# Patient Record
Sex: MALE | Race: WHITE | Employment: OTHER | URBAN - METROPOLITAN AREA
[De-identification: names, ages, dates, MRNs, and addresses within clinical notes are randomized per-mention and may not be internally consistent; named-entity substitution may affect disease eponyms.]

---

## 2017-04-25 ENCOUNTER — OFFICE VISIT (OUTPATIENT)
Dept: INTERNAL MEDICINE CLINIC | Age: 37
End: 2017-04-25

## 2017-04-25 VITALS
WEIGHT: 187.2 LBS | HEIGHT: 67 IN | RESPIRATION RATE: 16 BRPM | SYSTOLIC BLOOD PRESSURE: 120 MMHG | BODY MASS INDEX: 29.38 KG/M2 | DIASTOLIC BLOOD PRESSURE: 70 MMHG | TEMPERATURE: 98.5 F | HEART RATE: 84 BPM | OXYGEN SATURATION: 98 %

## 2017-04-25 DIAGNOSIS — R35.0 URINE FREQUENCY: ICD-10-CM

## 2017-04-25 DIAGNOSIS — Z12.5 PROSTATE CANCER SCREENING: ICD-10-CM

## 2017-04-25 DIAGNOSIS — Z00.00 ROUTINE GENERAL MEDICAL EXAMINATION AT A HEALTH CARE FACILITY: Primary | ICD-10-CM

## 2017-04-25 DIAGNOSIS — N40.0 ENLARGED PROSTATE: ICD-10-CM

## 2017-04-25 DIAGNOSIS — E03.9 HYPOTHYROIDISM, UNSPECIFIED TYPE: ICD-10-CM

## 2017-04-25 DIAGNOSIS — F33.1 MODERATE EPISODE OF RECURRENT MAJOR DEPRESSIVE DISORDER (HCC): ICD-10-CM

## 2017-04-25 DIAGNOSIS — E55.9 VITAMIN D DEFICIENCY: ICD-10-CM

## 2017-04-25 DIAGNOSIS — F41.9 ANXIETY: ICD-10-CM

## 2017-04-25 DIAGNOSIS — I10 ESSENTIAL HYPERTENSION: ICD-10-CM

## 2017-04-25 RX ORDER — LEVOTHYROXINE SODIUM 100 UG/1
TABLET ORAL
COMMUNITY
End: 2017-05-08 | Stop reason: SDUPTHER

## 2017-04-25 RX ORDER — ESCITALOPRAM OXALATE 20 MG/1
20 TABLET ORAL DAILY
COMMUNITY

## 2017-04-25 RX ORDER — CLONAZEPAM 0.5 MG/1
TABLET ORAL
COMMUNITY

## 2017-04-25 RX ORDER — BUPROPION HYDROCHLORIDE 100 MG/1
TABLET, EXTENDED RELEASE ORAL
COMMUNITY

## 2017-04-25 RX ORDER — LISINOPRIL 20 MG/1
TABLET ORAL DAILY
COMMUNITY
End: 2017-05-08 | Stop reason: SDUPTHER

## 2017-04-25 NOTE — PROGRESS NOTES
New Patient       HPI:  Verito Hutchison is a 40y.o. year old male who is here to establish care. He  had his medical care: Had PCP a long time ago Dr. Katian Chandler in [place]. Friend of a girlfriend referred me. He reports the following history and medical concerns:      HTN- lisinopril. [facility]- 2007 diagnosis. No cough with medication. Anxiety and Depression- [address]- and then went to [facility] MD (psychiatry in [place]). Increased lexapro 20 mg to 30 mg and restarted wellbutrin. Takes clonazepam once a day for panic attacks. Low thyroid- takes 100 mcg once a day. Last checked a year ago. Urinating a lot- had problem for a long time. Weak urine stream.  Random. Drinks a lot of coffee. Denies hesitancy. Flood z[place] are not open. Push harder sometimes but not all of the time. When he goes again, it is not as much (which means he is emptying out)  Going on for at least 10 years. Has apneic episodes and daytime fatigue. History of low Vit D        Assessment and Plan        1. Routine general medical examination at a health care facility  Needs to increase exercise regularly to 150 minutes a week. - CBC WITH AUTOMATED DIFF  - TSH REFLEX TO T4  - METABOLIC PANEL, COMPREHENSIVE  - UA/M W/RFLX CULTURE, ROUTINE  - MICROALBUMIN, UR, RAND W/ MICROALBUMIN/CREA RATIO    2. Urine frequency  Check urine for sugar. Take longer time to empty. Reduce caffeine intake. 3. Moderate episode of recurrent major depressive disorder ([address]  As per psychiatry. On lexapro and wellbutrin SR    4. Anxiety  Uses clonazepam each night. Does not mix with alcohol. 5. Essential hypertension  Tolerating medication. Discussion on its effectiveness and queried about side effects. Patient agrees to stay on medication and demonstrate compliance. No adjustments made on  Lisinopril. - MICROALBUMIN, UR, RAND W/ MICROALBUMIN/CREA RATIO    6.  Hypothyroidism, unspecified type  Tolerating medication. Discussion on its effectiveness and queried about side effects. Patient agrees to stay on medication and demonstrate compliance. No adjustments made on  Synthroid. No symptoms of low or high thyroid. 7. Vitamin D deficiency  Recheck vit . D  - VITAMIN D, 25 HYDROXY    8. Prostate cancer screening  Appears to have incomplete emptying. Possible enlarged prostate. Too early to screen for cancer. 9. Enlarged prostate  Check PSA   - PROSTATE SPECIFIC AG        Visit Vitals    /70 (BP 1 Location: Left arm, BP Patient Position: Sitting)    Pulse 84    Temp 98.5 °F (36.9 °C) (Oral)    Resp 16    Ht 5' 6.53\" (1.69 m)    Wt 187 lb 3.2 oz (84.9 kg)    SpO2 98%    BMI 29.73 kg/m2       Historical Data    Past Medical History:   Diagnosis Date    Depression     GERD (gastroesophageal reflux disease)     Hypertension     Thyroid disease        Past Surgical History:   Procedure Laterality Date    HX APPENDECTOMY      2016       Outpatient Encounter Prescriptions as of 4/25/2017   Medication Sig Dispense Refill    escitalopram oxalate (LEXAPRO) 20 mg tablet Take 20 mg by mouth daily. Indications: patient take 1 & 1/2 pill per day      buPROPion SR (WELLBUTRIN SR) 100 mg SR tablet Take  by mouth.  clonazePAM (KLONOPIN) 0.5 mg tablet Take  by mouth nightly as needed.  levothyroxine (SYNTHROID) 100 mcg tablet Take  by mouth Daily (before breakfast).  lisinopril (PRINIVIL, ZESTRIL) 20 mg tablet Take  by mouth daily.  CHOLECALCIFEROL, VITD3,/VIT K2 (VITAMIN D3-VITAMIN K2 PO) Take  by mouth. Indications: vitamin d3 plus vitamin K2 5000 IU per day      PYRIDOXINE HCL, VITAMIN B6, (VITAMIN B-6 PO) Take  by mouth. No facility-administered encounter medications on file as of 4/25/2017.          No Known Allergies     Social History     Social History    Marital status:      Spouse name: N/A    Number of children: N/A    Years of education: N/A     Occupational History    Disability for Anxiety/Panic attack      Social History Main Topics    Smoking status: Former Smoker     Types: Cigarettes     Quit date: 1/1/2011    Smokeless tobacco: Current User    Alcohol use No    Drug use: No    Sexual activity: Yes     Partners: Female     Other Topics Concern    Not on file     Social History Narrative    No narrative on file        Review of Systems   Constitutional: Negative for chills, diaphoresis, fever, malaise/fatigue and weight loss. HENT: Negative for hearing loss. Respiratory: Negative for cough. Cardiovascular: Negative for chest pain. Gastrointestinal: Negative for blood in stool and constipation. Genitourinary: Positive for frequency. Negative for dysuria, flank pain and urgency. Musculoskeletal: Negative for myalgias. Skin: Negative for rash. Neurological: Negative for dizziness, weakness and headaches. Endo/Heme/Allergies: Does not bruise/bleed easily. Physical Exam   Constitutional: He is oriented to person, place, and time. He appears well-nourished. No distress. Neck: No thyromegaly present. Cardiovascular: Normal rate, regular rhythm and normal heart sounds. Pulmonary/Chest: Effort normal and breath sounds normal. No respiratory distress. He has no wheezes. Abdominal: Soft. Bowel sounds are normal. He exhibits no mass. There is no tenderness. Hernia confirmed negative in the right inguinal area and confirmed negative in the left inguinal area. Genitourinary: Penis normal. Right testis shows no mass, no swelling and no tenderness. Left testis shows no mass, no swelling and no tenderness. Musculoskeletal: He exhibits no edema or tenderness. Lymphadenopathy:     He has no cervical adenopathy. No inguinal adenopathy noted on the right or left side. Right: No inguinal adenopathy present. Left: No inguinal adenopathy present.    Neurological: He is alert and oriented to person, place, and time. Skin: Skin is warm and dry. No rash noted. No erythema. Psychiatric: He has a normal mood and affect. Thought content normal.   Nursing note and vitals reviewed. Ortho Exam       Orders Placed This Encounter    CBC WITH AUTOMATED DIFF    TSH REFLEX TO T4    METABOLIC PANEL, COMPREHENSIVE    VITAMIN D, 25 HYDROXY    UA/M W/RFLX CULTURE, ROUTINE    MICROALBUMIN, UR, RAND W/ MICROALBUMIN/CREA RATIO    PROSTATE SPECIFIC AG    MICROSCOPIC EXAMINATION    escitalopram oxalate (LEXAPRO) 20 mg tablet     Sig: Take 20 mg by mouth daily. Indications: patient take 1 & 1/2 pill per day    buPROPion SR (WELLBUTRIN SR) 100 mg SR tablet     Sig: Take  by mouth.  clonazePAM (KLONOPIN) 0.5 mg tablet     Sig: Take  by mouth nightly as needed.  levothyroxine (SYNTHROID) 100 mcg tablet     Sig: Take  by mouth Daily (before breakfast).  lisinopril (PRINIVIL, ZESTRIL) 20 mg tablet     Sig: Take  by mouth daily.  CHOLECALCIFEROL, VITD3,/VIT K2 (VITAMIN D3-VITAMIN K2 PO)     Sig: Take  by mouth. Indications: vitamin d3 plus vitamin K2 5000 IU per day    PYRIDOXINE HCL, VITAMIN B6, (VITAMIN B-6 PO)     Sig: Take  by mouth. I have reviewed the patient's medical history in detail and updated the computerized patient record. We had a prolonged discussion about these complex clinical issues and went over the various important aspects to consider. All questions were answered. Advised him to call back or return to office if symptoms do not improve, change in nature, or persist.    He was given an after visit summary or informed of [email or web address] Access which includes patient instructions, diagnoses, current medications, & vitals. He expressed understanding with the diagnosis and plan.

## 2017-04-25 NOTE — PROGRESS NOTES
Chief Complaint   Patient presents with    New Patient     Reviewed record in preparation for visit and have obtained necessary documentation. Identified pt with two pt identifiers(name and ). Health Maintenance Due   Topic    DTaP/Tdap/Td series (1 - Tdap)    INFLUENZA AGE 9 TO ADULT          Chief Complaint   Patient presents with    New Patient        Wt Readings from Last 3 Encounters:   17 187 lb 3.2 oz (84.9 kg)     Temp Readings from Last 3 Encounters:   17 98.5 °F (36.9 °C) (Oral)     BP Readings from Last 3 Encounters:   17 120/70     Pulse Readings from Last 3 Encounters:   17 84           Learning Assessment:  :     Learning Assessment 2017   PRIMARY LEARNER Patient   HIGHEST LEVEL OF EDUCATION - PRIMARY LEARNER  SOME COLLEGE   BARRIERS PRIMARY LEARNER NONE   CO-LEARNER CAREGIVER No   PRIMARY LANGUAGE ENGLISH   LEARNER PREFERENCE PRIMARY VIDEOS   ANSWERED BY patient   RELATIONSHIP SELF       Depression Screening:  :     No flowsheet data found. Fall Risk Assessment:  :     No flowsheet data found. Abuse Screening:  :     Abuse Screening Questionnaire 2017   Do you ever feel afraid of your partner? N   Are you in a relationship with someone who physically or mentally threatens you? N   Is it safe for you to go home? Y       Coordination of Care Questionnaire:  :     1) Have you been to an emergency room, urgent care clinic since your last visit? no   Hospitalized since your last visit? no             2) Have you seen or consulted any other health care providers outside of [address] [place] [address] since your last visit? no  (Include any pap smears or colon screenings in this section.)    3) Do you have an Advance Directive on file? no    4) Are you interested in receiving information on Advance Directives?  NO      Patient is accompanied by self I have received verbal consent from Karma Bell to discuss any/all medical information while they are present in the room. Reviewed record  In preparation for visit and have obtained necessary documentation.

## 2017-04-25 NOTE — MR AVS SNAPSHOT
Visit Information Date & Time Provider Department Dept. Phone Encounter #  
 4/25/2017  1:15 PM [place] [id number] [phone] Follow-up Instructions Return in about 6 months (around 10/25/2017) for Follow up. Upcoming Health Maintenance Date Due DTaP/Tdap/Td series (1 - Tdap) 4/13/2001 INFLUENZA AGE 9 TO ADULT 8/1/2016 Allergies as of 4/25/2017  Review Complete On: 4/25/2017 By: Karine Pickens LPN No Known Allergies Current Immunizations  Never Reviewed No immunizations on file. Not reviewed this visit You Were Diagnosed With   
  
 Codes Comments Routine general medical examination at a health care facility    -  Primary ICD-10-CM: Z00.00 ICD-9-CM: V70.0 Urine frequency     ICD-10-CM: R35.0 ICD-9-CM: 788.41 Moderate episode of recurrent major depressive disorder (HCC)     ICD-10-CM: F33.1 ICD-9-CM: 296.32 Anxiety     ICD-10-CM: F41.9 ICD-9-CM: 300.00 Essential hypertension     ICD-10-CM: I10 
ICD-9-CM: 401.9 Hypothyroidism, unspecified type     ICD-10-CM: E03.9 ICD-9-CM: 881. 9 Vitamin D deficiency     ICD-10-CM: E55.9 ICD-9-CM: 268.9 Prostate cancer screening     ICD-10-CM: Z12.5 ICD-9-CM: V76.44 Enlarged prostate     ICD-10-CM: N40.0 ICD-9-CM: 600.00 Vitals BP Pulse Temp Resp Height(growth percentile) Weight(growth percentile) 120/70 (BP 1 Location: Left arm, BP Patient Position: Sitting) 84 98.5 °F (36.9 °C) (Oral) 16 5' 6.53\" (1.69 m) 187 lb 3.2 oz (84.9 kg) SpO2 BMI Smoking Status 98% 29.73 kg/m2 Former Smoker Vitals History BMI and BSA Data Body Mass Index Body Surface Area  
 29.73 kg/m 2 2 m 2 Preferred Pharmacy [facility] [address] [address] Your Updated Medication List  
  
   
 This list is accurate as of: 4/25/17  2:27 PM.  Always use your most recent med list.  
  
  
  
  
 buPROPion  mg SR tablet Commonly known as:  Juan David Nunez Take  by mouth.  
  
 clonazePAM 0.5 mg tablet Commonly known as:  Chiquis Saucedo Take  by mouth nightly as needed. levothyroxine 100 mcg tablet Commonly known as:  SYNTHROID Take  by mouth Daily (before breakfast). LEXAPRO 20 mg tablet Generic drug:  escitalopram oxalate Take 20 mg by mouth daily. Indications: patient take 1 & 1/2 pill per day  
  
 lisinopril 20 mg tablet Commonly known as:  Oneida Charles Take  by mouth daily. VITAMIN B-6 PO Take  by mouth. VITAMIN D3-VITAMIN K2 PO Take  by mouth. Indications: vitamin d3 plus vitamin K2 5000 IU per day We Performed the Following CBC WITH AUTOMATED DIFF [97824 CPT(R)] METABOLIC PANEL, COMPREHENSIVE [22941 CPT(R)] MICROALBUMIN, UR, RAND W/ MICROALBUMIN/CREA RATIO C871345 CPT(R)] PROSTATE SPECIFIC AG (PSA) D369708 CPT(R)] TSH REFLEX TO T4 [[id number] Custom] UA/M W/RFLX CULTURE, ROUTINE [[id number] Custom] VITAMIN D, 25 HYDROXY [id number] CPT(R)] Follow-up Instructions Return in about 6 months (around 10/25/2017) for Follow up. Patient Instructions Fexofenadine 180 mg once a day And  
nasacort AQ one spray twice a day. Introducing [facility]! Lola Richardson introduces [employer] patient portal. Now you can access parts of your medical record, email your doctor's office, and request medication refills online. 1. In your internet browser, go to [email or web address] 2. Click on the First Time User? Click Here link in the Sign In box. You will see the New Member Sign Up page. 3. Enter your [employer] Access Code exactly as it appears below. You will not need to use this code after youve completed the sign-up process.  If you do not sign up before the expiration date, you must request a new code. · "[employer] Access Code: [email or web address] Expires: 7/24/2017  2:09 PM 
 
4. Enter the last four digits of your Social Security Number (xxxx) and Date of Birth (mm/dd/yyyy) as indicated and click Submit. You will be taken to the next sign-up page. 5. Create a "[employer] ID. This will be your "[employer] login ID and cannot be changed, so think of one that is secure and easy to remember. 6. Create a "[employer] password. You can change your password at any time. 7. Enter your Password Reset Question and Answer. This can be used at a later time if you forget your password. 8. Enter your e-mail address. You will receive e-mail notification when new information is available in [address] 9. Click Sign Up. You can now view and download portions of your medical record. 10. Click the Download Summary menu link to download a portable copy of your medical information. If you have questions, please visit the Frequently Asked Questions section of the "[employer] website. Remember, "[employer] is NOT to be used for urgent needs. For medical emergencies, dial 911. Now available from your iPhone and Android! Please provide this summary of care documentation to your next provider. If you have any questions after today's visit, please call [phone].

## 2017-04-26 LAB
25(OH)D3+25(OH)D2 SERPL-MCNC: 50.6 NG/ML (ref 30–100)
ALBUMIN SERPL-MCNC: 4.8 G/DL (ref 3.5–5.5)
ALBUMIN/CREAT UR: <3.4 MG/G CREAT (ref 0–30)
ALBUMIN/GLOB SERPL: 2 {RATIO} (ref 1.2–2.2)
ALP SERPL-CCNC: 101 IU/L (ref 39–117)
ALT SERPL-CCNC: 18 IU/L (ref 0–44)
APPEARANCE UR: CLEAR
AST SERPL-CCNC: 20 IU/L (ref 0–40)
BACTERIA #/AREA URNS HPF: NORMAL /[HPF]
BASOPHILS # BLD AUTO: 0 X10E3/UL (ref 0–0.2)
BASOPHILS NFR BLD AUTO: 1 %
BILIRUB SERPL-MCNC: 0.5 MG/DL (ref 0–1.2)
BILIRUB UR QL STRIP: NEGATIVE
BUN SERPL-MCNC: 10 MG/DL (ref 6–20)
BUN/CREAT SERPL: 13 (ref 9–20)
CALCIUM SERPL-MCNC: 9.1 MG/DL (ref 8.7–10.2)
CASTS URNS QL MICRO: NORMAL /LPF
CHLORIDE SERPL-SCNC: 101 MMOL/L (ref 96–106)
CO2 SERPL-SCNC: 24 MMOL/L (ref 18–29)
COLOR UR: YELLOW
CREAT SERPL-MCNC: 0.8 MG/DL (ref 0.76–1.27)
CREAT UR-MCNC: 88 MG/DL
EOSINOPHIL # BLD AUTO: 0.3 X10E3/UL (ref 0–0.4)
EOSINOPHIL NFR BLD AUTO: 3 %
EPI CELLS #/AREA URNS HPF: NORMAL /HPF
ERYTHROCYTE [DISTWIDTH] IN BLOOD BY AUTOMATED COUNT: 13.3 % (ref 12.3–15.4)
GLOBULIN SER CALC-MCNC: 2.4 G/DL (ref 1.5–4.5)
GLUCOSE SERPL-MCNC: 95 MG/DL (ref 65–99)
GLUCOSE UR QL: NEGATIVE
HCT VFR BLD AUTO: 43.6 % (ref 37.5–51)
HGB BLD-MCNC: 14.5 G/DL (ref 12.6–17.7)
HGB UR QL STRIP: NEGATIVE
IMM GRANULOCYTES # BLD: 0 X10E3/UL (ref 0–0.1)
IMM GRANULOCYTES NFR BLD: 0 %
KETONES UR QL STRIP: NEGATIVE
LEUKOCYTE ESTERASE UR QL STRIP: NEGATIVE
LYMPHOCYTES # BLD AUTO: 1.6 X10E3/UL (ref 0.7–3.1)
LYMPHOCYTES NFR BLD AUTO: 21 %
MCH RBC QN AUTO: 28.9 PG (ref 26.6–33)
MCHC RBC AUTO-ENTMCNC: 33.3 G/DL (ref 31.5–35.7)
MCV RBC AUTO: 87 FL (ref 79–97)
MICRO URNS: NORMAL
MICRO URNS: NORMAL
MICROALBUMIN UR-MCNC: <3 UG/ML
MONOCYTES # BLD AUTO: 0.5 X10E3/UL (ref 0.1–0.9)
MONOCYTES NFR BLD AUTO: 6 %
MUCOUS THREADS URNS QL MICRO: PRESENT
NEUTROPHILS # BLD AUTO: 5.3 X10E3/UL (ref 1.4–7)
NEUTROPHILS NFR BLD AUTO: 69 %
NITRITE UR QL STRIP: NEGATIVE
PH UR STRIP: 6 [PH] (ref 5–7.5)
PLATELET # BLD AUTO: 234 X10E3/UL (ref 150–379)
POTASSIUM SERPL-SCNC: 4.2 MMOL/L (ref 3.5–5.2)
PROT SERPL-MCNC: 7.2 G/DL (ref 6–8.5)
PROT UR QL STRIP: NEGATIVE
PSA SERPL-MCNC: 1.4 NG/ML (ref 0–4)
RBC # BLD AUTO: 5.01 X10E6/UL (ref 4.14–5.8)
RBC #/AREA URNS HPF: NORMAL /HPF
SODIUM SERPL-SCNC: 140 MMOL/L (ref 134–144)
SP GR UR: 1.02 (ref 1–1.03)
TSH SERPL DL<=0.005 MIU/L-ACNC: 2.42 UIU/ML (ref 0.45–4.5)
URINALYSIS REFLEX, 377202: NORMAL
UROBILINOGEN UR STRIP-MCNC: 0.2 MG/DL (ref 0.2–1)
WBC # BLD AUTO: 7.7 X10E3/UL (ref 3.4–10.8)
WBC #/AREA URNS HPF: NORMAL /HPF

## 2017-04-27 NOTE — PROGRESS NOTES
All of your blood tests came back normal including your prostate.   Message sent to patient via [employer]:  April 27, 2017

## 2017-05-08 RX ORDER — LISINOPRIL 20 MG/1
20 TABLET ORAL DAILY
Qty: 90 TAB | Refills: 3 | Status: SHIPPED | OUTPATIENT
Start: 2017-05-08 | End: 2017-10-24 | Stop reason: SDUPTHER

## 2017-05-08 RX ORDER — LEVOTHYROXINE SODIUM 100 UG/1
100 TABLET ORAL
Qty: 90 TAB | Refills: 3 | Status: SHIPPED | OUTPATIENT
Start: 2017-05-08 | End: 2017-10-24 | Stop reason: SDUPTHER

## 2017-10-24 ENCOUNTER — OFFICE VISIT (OUTPATIENT)
Dept: INTERNAL MEDICINE CLINIC | Age: 37
End: 2017-10-24

## 2017-10-24 VITALS
DIASTOLIC BLOOD PRESSURE: 64 MMHG | RESPIRATION RATE: 18 BRPM | WEIGHT: 187 LBS | SYSTOLIC BLOOD PRESSURE: 116 MMHG | BODY MASS INDEX: 29.35 KG/M2 | OXYGEN SATURATION: 98 % | TEMPERATURE: 98.7 F | HEART RATE: 70 BPM | HEIGHT: 67 IN

## 2017-10-24 DIAGNOSIS — F41.9 ANXIETY AND DEPRESSION: ICD-10-CM

## 2017-10-24 DIAGNOSIS — E03.9 HYPOTHYROIDISM, UNSPECIFIED TYPE: ICD-10-CM

## 2017-10-24 DIAGNOSIS — F32.A ANXIETY AND DEPRESSION: ICD-10-CM

## 2017-10-24 DIAGNOSIS — I10 ESSENTIAL HYPERTENSION: Primary | ICD-10-CM

## 2017-10-24 DIAGNOSIS — E55.9 VITAMIN D DEFICIENCY: ICD-10-CM

## 2017-10-24 DIAGNOSIS — Z00.00 ROUTINE GENERAL MEDICAL EXAMINATION AT A HEALTH CARE FACILITY: ICD-10-CM

## 2017-10-24 RX ORDER — LEVOTHYROXINE SODIUM 100 UG/1
100 TABLET ORAL
Qty: 90 TAB | Refills: 3 | Status: SHIPPED | OUTPATIENT
Start: 2017-10-24 | End: 2018-05-31 | Stop reason: SDUPTHER

## 2017-10-24 RX ORDER — LISINOPRIL 20 MG/1
20 TABLET ORAL DAILY
Qty: 90 TAB | Refills: 3 | Status: SHIPPED | OUTPATIENT
Start: 2017-10-24 | End: 2018-09-28 | Stop reason: SDUPTHER

## 2017-10-24 NOTE — PROGRESS NOTES
Hypertension (6 months follow ); Anxiety; and Vitamin D Deficiency       HPI:  Laura Clinton is a 40y.o. year old male who is here for a follow up visit. He was last seen by me 6 months ago. He reports the following:    Hypertension    Patient has a history of HTN. The patient is taking hypertensive medications compliantly without side effects. Denies chest pain, dyspnea, edema, or symptoms of TIA's. BP Readings from Last 3 Encounters:   10/24/17 116/64   04/25/17 120/70          Hypothyroidism    Patient is being follow for underactive thyroid and takes medication regularly. He is aware to take the medication on an empty stomach first thing in the morning and not to eat 30 min to 1 hr after taking their pill. No symptoms of hypo or hyperthyroidism: no decreased or increased weight, no feeling cold/chilly or excessively warm, no diarrhea or constipation, no undue sweatiness, anxiety or palpitations. Lab Results   Component Value Date/Time    TSH 2.420 04/25/2017 02:30 PM         Low vit d. Takes 5000 d3 once a day. Pt reports Tdap a couple of years ago. Depression/Anxiety    Compliant with medications. No impulsive thoughts and/or side effects with medications    Sleep has been better        Assessment and Plan        1. Essential hypertension  Tolerating medication. Denies dizziness that is positional, SOB, or chest pain. Understands the importance of compliance to reduce risk of future heart failure. Agreed to call if any of above symptoms develop and  stay on current regimen of  Lisinopril.   - CBC WITH AUTOMATED DIFF; Future  - METABOLIC PANEL, COMPREHENSIVE; Future  - LIPID PANEL; Future  - TSH 3RD GENERATION; Future  - URINALYSIS W/ RFLX MICROSCOPIC; Future  - MICROALBUMIN, UR, RAND W/ MICROALBUMIN/CREA RATIO; Future  - VITAMIN D, 25 HYDROXY; Future    2. Hypothyroidism, unspecified type  Tolerating thyroid medication and takes on empty stomach.   Aware to watch for symptoms of overactive thyroid like heat intolerance, loose stools, increase appetite, and weight loss along with palpitations. Pt informed if low thyroid symptoms, like cold intolerance, weight gain, hair loss, edema, and mental slowness, please call to get TSH rechecked. Noncompliance of medications can lead to coma and severe mental status changes. Stay on current regimen of  Synthroid 100   - CBC WITH AUTOMATED DIFF; Future  - METABOLIC PANEL, COMPREHENSIVE; Future  - LIPID PANEL; Future  - TSH 3RD GENERATION; Future  - URINALYSIS W/ RFLX MICROSCOPIC; Future  - MICROALBUMIN, UR, RAND W/ MICROALBUMIN/CREA RATIO; Future  - VITAMIN D, 25 HYDROXY; Future    3. Anxiety and depression  As per psychiatry. Pt appears to be doing well. - CBC WITH AUTOMATED DIFF; Future  - METABOLIC PANEL, COMPREHENSIVE; Future  - LIPID PANEL; Future  - TSH 3RD GENERATION; Future  - URINALYSIS W/ RFLX MICROSCOPIC; Future  - MICROALBUMIN, UR, RAND W/ MICROALBUMIN/CREA RATIO; Future  - VITAMIN D, 25 HYDROXY; Future    4. Routine general medical examination at a health care facility  Preventive exam not done today. Diagnosis placed so I can associate lab orders.   - CBC WITH AUTOMATED DIFF; Future  - METABOLIC PANEL, COMPREHENSIVE; Future  - LIPID PANEL; Future  - TSH 3RD GENERATION; Future  - URINALYSIS W/ RFLX MICROSCOPIC; Future  - MICROALBUMIN, UR, RAND W/ MICROALBUMIN/CREA RATIO; Future  - VITAMIN D, 25 HYDROXY; Future    5. Vitamin D deficiency   Patient compliant with Vit D. Emphasized importance of taking with food and not too much because it can be toxic to our body. Therefore, it should be checked regularly. Levels ordered.    - VITAMIN D, 25 HYDROXY; Future          Visit Vitals    /64 (BP 1 Location: Left arm, BP Patient Position: Sitting)    Pulse 70    Temp 98.7 °F (37.1 °C) (Oral)    Resp 18    Ht 5' 6.9\" (1.699 m)    Wt 187 lb (84.8 kg)    SpO2 98%    BMI 29.38 kg/m2       Historical Data    Past Medical History: Diagnosis Date    Depression     GERD (gastroesophageal reflux disease)     Hypertension     Thyroid disease        Past Surgical History:   Procedure Laterality Date    HX APPENDECTOMY      2016       Outpatient Encounter Prescriptions as of 10/24/2017   Medication Sig Dispense Refill    levothyroxine (SYNTHROID) 100 mcg tablet Take 1 Tab by mouth Daily (before breakfast). 90 Tab 3    lisinopril (PRINIVIL, ZESTRIL) 20 mg tablet Take 1 Tab by mouth daily. 90 Tab 3    escitalopram oxalate (LEXAPRO) 20 mg tablet Take 20 mg by mouth daily. Indications: patient take 1 & 1/2 pill per day      buPROPion SR (WELLBUTRIN SR) 100 mg SR tablet Take  by mouth.  clonazePAM (KLONOPIN) 0.5 mg tablet Take  by mouth nightly as needed.  CHOLECALCIFEROL, VITD3,/VIT K2 (VITAMIN D3-VITAMIN K2 PO) Take  by mouth. Indications: vitamin d3 plus vitamin K2 5000 IU per day      PYRIDOXINE HCL, VITAMIN B6, (VITAMIN B-6 PO) Take  by mouth. No facility-administered encounter medications on file as of 10/24/2017. No Known Allergies     Social History     Social History    Marital status:      Spouse name: N/A    Number of children: N/A    Years of education: N/A     Occupational History    Disability for Anxiety/Panic attack      Social History Main Topics    Smoking status: Former Smoker     Types: Cigarettes     Quit date: 1/1/2011    Smokeless tobacco: Current User    Alcohol use No    Drug use: No    Sexual activity: Yes     Partners: Female     Other Topics Concern    Not on file     Social History Narrative        family history includes Bipolar Disorder in his mother; Cancer in his father; Diabetes in his paternal uncle; Heart Disease in his father; Hypertension in his father and mother. Review of Systems   Constitutional: Negative for chills, diaphoresis, fever, malaise/fatigue and weight loss. HENT: Negative for hearing loss. Respiratory: Negative for cough. Cardiovascular: Negative for chest pain. Gastrointestinal: Negative for blood in stool and constipation. Genitourinary: Negative for dysuria, flank pain, frequency and urgency. Musculoskeletal: Negative for myalgias. Skin: Negative for rash. Neurological: Negative for dizziness, weakness and headaches. Endo/Heme/Allergies: Does not bruise/bleed easily. Physical Exam   Constitutional: He appears well-developed and well-nourished. He is active. Non-toxic appearance. He does not have a sickly appearance. He does not appear ill. No distress. Eyes: Conjunctivae are normal. Right eye exhibits no discharge. Cardiovascular: Normal rate, regular rhythm, S1 normal, S2 normal, normal heart sounds and normal pulses. Exam reveals no gallop and no friction rub. Pulmonary/Chest: Effort normal and breath sounds normal. No respiratory distress. Abdominal: Soft. Bowel sounds are normal.   Musculoskeletal: He exhibits no edema or deformity. Neurological: He is alert. Skin: Skin is warm and dry. No rash noted. No pallor. Psychiatric: His behavior is normal. His mood appears not anxious. His affect is blunt. His affect is not labile and not inappropriate. He is not agitated and not slowed. He does not exhibit a depressed mood. Vitals reviewed. Ortho Exam      No orders of the defined types were placed in this encounter. I have reviewed the patient's medical history in detail and updated the computerized patient record. We had a prolonged discussion about these complex clinical issues and went over the various important aspects to consider. All questions were answered. Advised him to call back or return to office if symptoms do not improve, change in nature, or persist.    He was given an after visit summary or informed of [facility] Access which includes patient instructions, diagnoses, current medications, & vitals. He expressed understanding with the diagnosis and plan.

## 2017-10-24 NOTE — PROGRESS NOTES
Chief Complaint   Patient presents with    Hypertension     6 months follow     Anxiety    Vitamin D Deficiency        1. Have you been to the ER, urgent care clinic since your last visit? No Hospitalized since your last visit? No    2. Have you seen or consulted any other health care providers outside of the [address] [place] [address] since your last visit? No  Include any pap smears or colon screening.  No

## 2018-05-31 ENCOUNTER — HOSPITAL ENCOUNTER (OUTPATIENT)
Dept: LAB | Age: 38
Discharge: HOME | End: 2018-05-31
Payer: MEDICARE

## 2018-05-31 ENCOUNTER — OFFICE VISIT (OUTPATIENT)
Dept: INTERNAL MEDICINE CLINIC | Age: 38
End: 2018-05-31

## 2018-05-31 VITALS
HEART RATE: 60 BPM | SYSTOLIC BLOOD PRESSURE: 140 MMHG | TEMPERATURE: 98.5 F | OXYGEN SATURATION: 96 % | BODY MASS INDEX: 28.8 KG/M2 | DIASTOLIC BLOOD PRESSURE: 90 MMHG | HEIGHT: 66 IN | WEIGHT: 179.2 LBS | RESPIRATION RATE: 15 BRPM

## 2018-05-31 DIAGNOSIS — I10 ESSENTIAL HYPERTENSION: ICD-10-CM

## 2018-05-31 DIAGNOSIS — F41.9 ANXIETY: ICD-10-CM

## 2018-05-31 DIAGNOSIS — R21 RASH: ICD-10-CM

## 2018-05-31 DIAGNOSIS — Z00.00 ROUTINE GENERAL MEDICAL EXAMINATION AT A HEALTH CARE FACILITY: Primary | ICD-10-CM

## 2018-05-31 DIAGNOSIS — E03.9 HYPOTHYROIDISM, UNSPECIFIED TYPE: ICD-10-CM

## 2018-05-31 DIAGNOSIS — E55.9 VITAMIN D DEFICIENCY: ICD-10-CM

## 2018-05-31 PROCEDURE — 36415 COLL VENOUS BLD VENIPUNCTURE: CPT

## 2018-05-31 PROCEDURE — 82306 VITAMIN D 25 HYDROXY: CPT

## 2018-05-31 PROCEDURE — 84443 ASSAY THYROID STIM HORMONE: CPT

## 2018-05-31 PROCEDURE — 85025 COMPLETE CBC W/AUTO DIFF WBC: CPT

## 2018-05-31 PROCEDURE — 80053 COMPREHEN METABOLIC PANEL: CPT

## 2018-05-31 RX ORDER — DOXYCYCLINE 100 MG/1
100 CAPSULE ORAL 2 TIMES DAILY
Qty: 20 CAP | Refills: 0 | Status: SHIPPED | OUTPATIENT
Start: 2018-05-31 | End: 2018-06-10

## 2018-05-31 NOTE — PROGRESS NOTES
Medication Evaluation       HPI:  Jenn Celaya is a 45y.o. year old male who is here for a physical.   He was last seen by me on 4/24/2018. He reports the following:    Missed last psychiatrist appointment    Hypertension    Patient has a history of HTN. The patient is taking hypertensive medications compliantly without side effects. Denies chest pain, dyspnea, edema, or symptoms of TIA's. BP Readings from Last 3 Encounters:   05/31/18 140/90   10/24/17 116/64   04/25/17 120/70       no dry cough    Hypothyroidism    Patient is being follow for underactive thyroid and takes medication regularly. He is aware to take the medication on an empty stomach first thing in the morning and not to eat 30 min to 1 hr after taking their pill. No symptoms of hypo or hyperthyroidism: no decreased or increased weight, no feeling cold/chilly or excessively warm, no diarrhea or constipation, no undue sweatiness, anxiety or palpitations. Lab Results   Component Value Date/Time    TSH 2.420 04/25/2017 02:30 PM       Depression/Anxiety    Compliant with medications. No impulsive thoughts and/or side effects with medications   managing ok. Doesn't exercise regularly. Do yard work. New Problem:     rash 3 weeks- starting to go away. Itches some. No tick bites. Assessment and Plan        1. Routine general medical examination at a health care facility  Well exam.  Please reschedule appt with your psychiatrist.   - CBC WITH AUTOMATED DIFF  - TSH REFLEX TO T4  - METABOLIC PANEL, COMPREHENSIVE  - VITAMIN D, 25 HYDROXY    2. Anxiety  As per psychiatry. No suicidal thoughts. Please reschedule appt. Increase exercise    3. Essential hypertension  Tolerating medication. Denies dizziness that is positional, SOB, or chest pain. Understands the importance of compliance to reduce risk of future heart failure. Agreed to call if any of above symptoms develop and  stay on current regimen of  Lisinopril. No cough    4. Vitamin D deficiency   Recheck vit D levels. He is now taking 2000 International units not regularly. - VITAMIN D, 25 HYDROXY    5. Hypothyroidism, unspecified type  Tolerating thyroid medication and takes on empty stomach. Aware to watch for symptoms of overactive thyroid like heat intolerance, loose stools, increase appetite, and weight loss along with palpitations. Pt informed if low thyroid symptoms, like cold intolerance, weight gain, hair loss, edema, and mental slowness, please call to get TSH rechecked. Noncompliance of medications can lead to coma and severe mental status changes. Stay on current regimen of  synthroid    6. Rash-  Hx of tick bite in other areas. Rash is faint but small central clearing present. No pustules. Macular in origin. - VITAMIN D, 25 HYDROXY        Visit Vitals    /90 (BP 1 Location: Left arm, BP Patient Position: Sitting)    Pulse 60    Temp 98.5 °F (36.9 °C) (Oral)    Resp 15    Ht 5' 6\" (1.676 m)    Wt 179 lb 3.2 oz (81.3 kg)    SpO2 96%    BMI 28.92 kg/m2       Historical Data    Past Medical History:   Diagnosis Date    Depression     GERD (gastroesophageal reflux disease)     Hypertension     Thyroid disease        Past Surgical History:   Procedure Laterality Date    HX APPENDECTOMY      2016       Outpatient Encounter Prescriptions as of 5/31/2018   Medication Sig Dispense Refill    levothyroxine (SYNTHROID) 100 mcg tablet TAKE ONE TABLET BY MOUTH ONCE DAILY BEFORE BREAKFAST 30 Tab 6    lisinopril (PRINIVIL, ZESTRIL) 20 mg tablet Take 1 Tab by mouth daily. 90 Tab 3    escitalopram oxalate (LEXAPRO) 20 mg tablet Take 20 mg by mouth daily. Indications: patient take 1 & 1/2 pill per day      buPROPion SR (WELLBUTRIN SR) 100 mg SR tablet Take  by mouth.  clonazePAM (KLONOPIN) 0.5 mg tablet Take  by mouth nightly as needed.  CHOLECALCIFEROL, VITD3,/VIT K2 (VITAMIN D3-VITAMIN K2 PO) Take  by mouth.  Indications: vitamin d3 plus vitamin K2 5000 IU per day      PYRIDOXINE HCL, VITAMIN B6, (VITAMIN B-6 PO) Take  by mouth.  [DISCONTINUED] levothyroxine (SYNTHROID) 100 mcg tablet Take 1 Tab by mouth Daily (before breakfast). 90 Tab 3     No facility-administered encounter medications on file as of 5/31/2018. No Known Allergies     Social History     Social History    Marital status:      Spouse name: N/A    Number of children: N/A    Years of education: N/A     Occupational History    Disability for Anxiety/Panic attack      Social History Main Topics    Smoking status: Former Smoker     Types: Cigarettes     Quit date: 1/1/2011    Smokeless tobacco: Current User    Alcohol use No    Drug use: No    Sexual activity: Yes     Partners: Female     Other Topics Concern    Not on file     Social History Narrative        family history includes Bipolar Disorder in his mother; Cancer in his father; Diabetes in his paternal uncle; Heart Disease in his father; Hypertension in his father and mother. Review of Systems   Constitutional: Negative for chills, diaphoresis, fever, malaise/fatigue and weight loss. HENT: Negative for hearing loss and sore throat. Respiratory: Negative for cough. Cardiovascular: Negative for chest pain and leg swelling. Gastrointestinal: Negative for blood in stool and constipation. Genitourinary: Negative for dysuria, flank pain, frequency and urgency. Musculoskeletal: Negative for myalgias. Skin: Positive for itching and rash. Neurological: Negative for dizziness, tremors, sensory change, weakness and headaches. Endo/Heme/Allergies: Does not bruise/bleed easily. Psychiatric/Behavioral: Positive for depression. Negative for substance abuse and suicidal ideas. The patient is nervous/anxious and has insomnia. Physical Exam   Constitutional: He is oriented to person, place, and time. He appears well-nourished. No distress. Neck: Carotid bruit is not present. No thyromegaly present. Cardiovascular: Normal rate, regular rhythm and normal heart sounds. Pulmonary/Chest: Effort normal and breath sounds normal. No respiratory distress. He has no wheezes. Abdominal: Soft. Bowel sounds are normal. He exhibits no mass. There is no tenderness. Musculoskeletal: He exhibits no edema or tenderness. Lymphadenopathy:     He has no cervical adenopathy. Neurological: He is alert and oriented to person, place, and time. No cranial nerve deficit. Skin: Skin is warm and dry. No rash noted. No erythema. Psychiatric: He has a normal mood and affect. Thought content normal. His mood appears not anxious. His affect is not blunt, not labile and not inappropriate. His speech is not delayed, not tangential and not slurred. He is withdrawn. He is not agitated and not hyperactive. Thought content is not paranoid and not delusional. Cognition and memory are not impaired. He does not express impulsivity. He does not exhibit a depressed mood. He expresses no suicidal ideation. Nursing note and vitals reviewed. Ortho Exam      No orders of the defined types were placed in this encounter. I have reviewed the patient's medical history in detail and updated the computerized patient record. We had a prolonged discussion about these complex clinical issues and went over the various important aspects to consider. All questions were answered. Advised him to call back or return to office if symptoms do not improve, change in nature, or persist.    He was given an after visit summary or informed of [facility] which includes patient instructions, diagnoses, current medications, & vitals. He expressed understanding with the diagnosis and plan.

## 2018-05-31 NOTE — PROGRESS NOTES
Chief Complaint   Patient presents with    Medication Evaluation     1. Have you been to the ER, urgent care clinic since your last visit? Hospitalized since your last visit? No    2. Have you seen or consulted any other health care providers outside of the [address] [place] [address] since your last visit? Include any pap smears or colon screening.  No

## 2018-06-01 LAB
25(OH)D3+25(OH)D2 SERPL-MCNC: 34.8 NG/ML (ref 30–100)
ALBUMIN SERPL-MCNC: 4.5 G/DL (ref 3.5–5.5)
ALBUMIN/GLOB SERPL: 1.8 {RATIO} (ref 1.2–2.2)
ALP SERPL-CCNC: 85 IU/L (ref 39–117)
ALT SERPL-CCNC: 20 IU/L (ref 0–44)
AST SERPL-CCNC: 18 IU/L (ref 0–40)
BASOPHILS # BLD AUTO: 0 X10E3/UL (ref 0–0.2)
BASOPHILS NFR BLD AUTO: 1 %
BILIRUB SERPL-MCNC: 0.5 MG/DL (ref 0–1.2)
BUN SERPL-MCNC: 11 MG/DL (ref 6–20)
BUN/CREAT SERPL: 13 (ref 9–20)
CALCIUM SERPL-MCNC: 9.8 MG/DL (ref 8.7–10.2)
CHLORIDE SERPL-SCNC: 100 MMOL/L (ref 96–106)
CO2 SERPL-SCNC: 26 MMOL/L (ref 18–29)
CREAT SERPL-MCNC: 0.84 MG/DL (ref 0.76–1.27)
EOSINOPHIL # BLD AUTO: 0.2 X10E3/UL (ref 0–0.4)
EOSINOPHIL NFR BLD AUTO: 3 %
ERYTHROCYTE [DISTWIDTH] IN BLOOD BY AUTOMATED COUNT: 13.3 % (ref 12.3–15.4)
GFR SERPLBLD CREATININE-BSD FMLA CKD-EPI: 111 ML/MIN/1.73
GFR SERPLBLD CREATININE-BSD FMLA CKD-EPI: 128 ML/MIN/1.73
GLOBULIN SER CALC-MCNC: 2.5 G/DL (ref 1.5–4.5)
GLUCOSE SERPL-MCNC: 95 MG/DL (ref 65–99)
HCT VFR BLD AUTO: 40.6 % (ref 37.5–51)
HGB BLD-MCNC: 13.7 G/DL (ref 13–17.7)
IMM GRANULOCYTES # BLD: 0 X10E3/UL (ref 0–0.1)
IMM GRANULOCYTES NFR BLD: 0 %
LYMPHOCYTES # BLD AUTO: 1.9 X10E3/UL (ref 0.7–3.1)
LYMPHOCYTES NFR BLD AUTO: 27 %
MCH RBC QN AUTO: 28.7 PG (ref 26.6–33)
MCHC RBC AUTO-ENTMCNC: 33.7 G/DL (ref 31.5–35.7)
MCV RBC AUTO: 85 FL (ref 79–97)
MONOCYTES # BLD AUTO: 0.4 X10E3/UL (ref 0.1–0.9)
MONOCYTES NFR BLD AUTO: 6 %
NEUTROPHILS # BLD AUTO: 4.3 X10E3/UL (ref 1.4–7)
NEUTROPHILS NFR BLD AUTO: 63 %
PLATELET # BLD AUTO: 235 X10E3/UL (ref 150–379)
POTASSIUM SERPL-SCNC: 4 MMOL/L (ref 3.5–5.2)
PROT SERPL-MCNC: 7 G/DL (ref 6–8.5)
RBC # BLD AUTO: 4.77 X10E6/UL (ref 4.14–5.8)
SODIUM SERPL-SCNC: 139 MMOL/L (ref 134–144)
TSH SERPL DL<=0.005 MIU/L-ACNC: 1.19 UIU/ML (ref 0.45–4.5)
WBC # BLD AUTO: 6.8 X10E3/UL (ref 3.4–10.8)

## 2018-06-01 NOTE — PROGRESS NOTES
The vitamin D was good but not as good as before. Please try to be more consistent with your vit d. Rest of labs looked good.   Message sent to patient via [employer]:  June 1, 2018

## 2018-09-28 RX ORDER — LISINOPRIL 20 MG/1
TABLET ORAL
Qty: 90 TAB | Refills: 3 | Status: SHIPPED | OUTPATIENT
Start: 2018-09-28

## 2022-03-19 PROBLEM — E03.9 HYPOTHYROIDISM: Status: ACTIVE | Noted: 2018-05-31

## 2022-03-19 PROBLEM — I10 ESSENTIAL HYPERTENSION: Status: ACTIVE | Noted: 2018-05-31

## 2022-03-19 PROBLEM — F41.9 ANXIETY: Status: ACTIVE | Noted: 2018-05-31

## 2022-03-19 PROBLEM — E55.9 VITAMIN D DEFICIENCY: Status: ACTIVE | Noted: 2018-05-31

## 2022-06-30 ENCOUNTER — HOSPITAL ENCOUNTER (EMERGENCY)
Age: 42
Discharge: HOME | End: 2022-06-30
Payer: COMMERCIAL

## 2022-06-30 VITALS
OXYGEN SATURATION: 99 % | WEIGHT: 195 LBS | DIASTOLIC BLOOD PRESSURE: 85 MMHG | RESPIRATION RATE: 18 BRPM | HEART RATE: 100 BPM | BODY MASS INDEX: 30.61 KG/M2 | HEIGHT: 67 IN | SYSTOLIC BLOOD PRESSURE: 149 MMHG | TEMPERATURE: 99.2 F

## 2022-06-30 DIAGNOSIS — L23.7 CONTACT DERMATITIS DUE TO POISON IVY: Primary | ICD-10-CM

## 2022-06-30 PROCEDURE — 99283 EMERGENCY DEPT VISIT LOW MDM: CPT

## 2022-06-30 RX ORDER — METHYLPREDNISOLONE 4 MG/1
TABLET ORAL
Qty: 1 DOSE PACK | Refills: 0 | Status: SHIPPED | OUTPATIENT
Start: 2022-06-30

## 2022-06-30 RX ORDER — PREDNISONE 20 MG/1
40 TABLET ORAL DAILY
Qty: 10 TABLET | Refills: 0 | Status: SHIPPED | OUTPATIENT
Start: 2022-06-30 | End: 2022-06-30

## 2022-06-30 RX ORDER — CEPHALEXIN 500 MG/1
500 CAPSULE ORAL 4 TIMES DAILY
Qty: 20 CAPSULE | Refills: 0 | Status: SHIPPED | OUTPATIENT
Start: 2022-06-30 | End: 2022-07-05

## 2022-06-30 NOTE — ED PROVIDER NOTES
EMERGENCY DEPARTMENT HISTORY AND PHYSICAL EXAM      Date: 6/30/2022  Patient Name: Guero Mcdaniel    History of Presenting Illness     Chief Complaint   Patient presents with    Rash       History Provided By: Patient    HPI: Guero Mcdaniel, 43 y.o. male with a significant past medical history of HTN, hypothyroidism, GERD, anxiety, and depression who presents to the ED with rash. Patient reports a 1 week history of a pruritic rash to his bilateral lower extremities and arms. Reports onset after working outside and suspects that he was exposed to poison ivy. Patient has been treating himself with calamine lotion with minimal relief. Denies any fever, chills, cough, congestion, body aches, night sweats. Patient states that he is otherwise well and has no further concerns. There are no other complaints, changes, or physical findings at this time. PCP: Akira Holland MD    No current facility-administered medications on file prior to encounter. Current Outpatient Medications on File Prior to Encounter   Medication Sig Dispense Refill    levothyroxine (SYNTHROID) 100 mcg tablet TAKE 1 TABLET BY MOUTH ONCE DAILY BEFORE BREAKFAST 30 Tab 6    lisinopril (PRINIVIL, ZESTRIL) 20 mg tablet TAKE ONE TABLET BY MOUTH ONCE DAILY 90 Tab 3    escitalopram oxalate (LEXAPRO) 20 mg tablet Take 20 mg by mouth daily. Indications: patient take 1 & 1/2 pill per day      buPROPion SR (WELLBUTRIN SR) 100 mg SR tablet Take  by mouth.  clonazePAM (KLONOPIN) 0.5 mg tablet Take  by mouth nightly as needed.  CHOLECALCIFEROL, VITD3,/VIT K2 (VITAMIN D3-VITAMIN K2 PO) Take  by mouth. Indications: vitamin d3 plus vitamin K2 5000 IU per day      PYRIDOXINE HCL, VITAMIN B6, (VITAMIN B-6 PO) Take  by mouth.          Past History     Past Medical History:  Past Medical History:   Diagnosis Date    Anxiety 5/31/2018    Depression     Essential hypertension 5/31/2018    GERD (gastroesophageal reflux disease)  Hypertension     Hypothyroidism 2018    Thyroid disease     Vitamin D deficiency  2018       Past Surgical History:  Past Surgical History:   Procedure Laterality Date    HX APPENDECTOMY      2016       Family History:  Family History   Problem Relation Age of Onset    Hypertension Mother     Bipolar Disorder Mother     Cancer Father         renal cancer    Heart Disease Father         heart valve replacement    Hypertension Father     Diabetes Paternal Uncle        Social History:  Social History     Tobacco Use    Smoking status: Former Smoker     Types: Cigarettes     Quit date: 2011     Years since quittin.5    Smokeless tobacco: Current User   Substance Use Topics    Alcohol use: No    Drug use: No       Allergies:  No Known Allergies    Review of Systems   Review of Systems   Constitutional: Negative. Negative for chills, diaphoresis and fever. HENT: Negative. Negative for congestion, rhinorrhea and sore throat. Eyes: Negative. Negative for pain. Respiratory: Negative. Negative for cough and shortness of breath. Cardiovascular: Negative. Negative for chest pain. Gastrointestinal: Negative. Negative for abdominal pain, diarrhea, nausea and vomiting. Genitourinary: Negative. Negative for difficulty urinating, dysuria, frequency and hematuria. Musculoskeletal: Negative. Skin: Positive for rash. Neurological: Negative. Negative for dizziness, weakness, light-headedness, numbness and headaches. Psychiatric/Behavioral: Negative. All other systems reviewed and are negative. Physical Exam   Physical Exam  Vitals and nursing note reviewed. Constitutional:       General: He is not in acute distress. Appearance: Normal appearance. He is not ill-appearing or toxic-appearing. HENT:      Head: Normocephalic and atraumatic.       Mouth/Throat:      Mouth: Mucous membranes are moist.   Eyes:      Extraocular Movements: Extraocular movements intact. Conjunctiva/sclera: Conjunctivae normal.      Pupils: Pupils are equal, round, and reactive to light. Cardiovascular:      Pulses: Normal pulses. Heart sounds: Normal heart sounds. No murmur heard. No friction rub. No gallop. Pulmonary:      Effort: Pulmonary effort is normal. No respiratory distress. Breath sounds: Normal breath sounds. No wheezing, rhonchi or rales. Abdominal:      General: Bowel sounds are normal. There is no distension. Palpations: Abdomen is soft. Tenderness: There is no abdominal tenderness. There is no guarding or rebound. Musculoskeletal:         General: No tenderness. Cervical back: Neck supple. No tenderness. Right lower leg: No edema. Left lower leg: No edema. Skin:     General: Skin is warm and dry. Capillary Refill: Capillary refill takes less than 2 seconds. Findings: Rash (pruritic to bilateral lower extremities (L>R)) present. Rash is crusting (weeping). Neurological:      General: No focal deficit present. Mental Status: He is alert and oriented to person, place, and time. Psychiatric:         Mood and Affect: Mood normal.         Behavior: Behavior normal.         Thought Content: Thought content normal.         Lab and Diagnostic Study Results   Labs -   No results found for this or any previous visit (from the past 12 hour(s)). Radiologic Studies -   @lastxrresult@  CT Results  (Last 48 hours)    None        CXR Results  (Last 48 hours)    None          Medical Decision Making and ED Course   - I am the first provider for this patient. I reviewed the vital signs, available nursing notes, past medical history, past surgical history, family history and social history. - Initial assessment performed. The patients presenting problems have been discussed, and they are in agreement with the care plan formulated and outlined with them.   I have encouraged them to ask questions as they arise throughout their visit. Vital Signs-Reviewed the patient's vital signs. Patient Vitals for the past 12 hrs:   Temp Pulse Resp BP SpO2   06/30/22 1634 99.2 °F (37.3 °C) 100 18 (!) 149/85 99 %       Differential Diagnosis & Medical Decision Making Provider Note:     MDM  Number of Diagnoses or Management Options  Contact dermatitis due to poison ivy  Diagnosis management comments: 42 y/o male presents with pruritic rash to his arms and legs after working outside. DDx: contact dermatitis, allergic reaction, eczema, psoriasis, lupus    Patient presents in no acute distress, non-toxic appearing, and with stable vitals. Exam most consistent with contact dermatitis. Will treat with course of steroids and provide referral to dermatology. Amount and/or Complexity of Data Reviewed  Review and summarize past medical records: yes            Procedures   Performed by: Cheyenne Gonsales PA-C  Procedures     Disposition   Disposition: DC- Adult Discharges: All of the diagnostic tests were reviewed and questions answered. Diagnosis, care plan and treatment options were discussed. The patient understands the instructions and will follow up as directed. The patients results have been reviewed with them. They have been counseled regarding their diagnosis. The patient verbally convey understanding and agreement of the signs, symptoms, diagnosis, treatment and prognosis and additionally agrees to follow up as recommended with their PCP in 24 - 48 hours. They also agree with the care-plan and convey that all of their questions have been answered. I have also put together some discharge instructions for them that include: 1) educational information regarding their diagnosis, 2) how to care for their diagnosis at home, as well a 3) list of reasons why they would want to return to the ED prior to their follow-up appointment, should their condition change. DISCHARGE PLAN:  1.    Current Discharge Medication List      CONTINUE these medications which have NOT CHANGED    Details   levothyroxine (SYNTHROID) 100 mcg tablet TAKE 1 TABLET BY MOUTH ONCE DAILY BEFORE BREAKFAST  Qty: 30 Tab, Refills: 6    Comments: Please consider 90 day supplies to promote better adherence      lisinopril (PRINIVIL, ZESTRIL) 20 mg tablet TAKE ONE TABLET BY MOUTH ONCE DAILY  Qty: 90 Tab, Refills: 3      escitalopram oxalate (LEXAPRO) 20 mg tablet Take 20 mg by mouth daily. Indications: patient take 1 & 1/2 pill per day      buPROPion SR (WELLBUTRIN SR) 100 mg SR tablet Take  by mouth.      clonazePAM (KLONOPIN) 0.5 mg tablet Take  by mouth nightly as needed. CHOLECALCIFEROL, VITD3,/VIT K2 (VITAMIN D3-VITAMIN K2 PO) Take  by mouth. Indications: vitamin d3 plus vitamin K2 5000 IU per day      PYRIDOXINE HCL, VITAMIN B6, (VITAMIN B-6 PO) Take  by mouth. 2.   Follow-up Information     Follow up With Specialties Details Why Contact Info    Genoveva Chan MD [facility] Medicine Schedule an appointment as soon as possible for a visit  As needed [address] [facility]   As needed [address] [facility]  [phone]        3. Return to ED if worse   4. Current Discharge Medication List      START taking these medications    Details   methylPREDNISolone (Medrol, Walter,) 4 mg tablet 6-5-4-3-2-1  Indications: contact dermatitis, a type of skin rash that occurs from contact with an offending substance  Qty: 1 Dose Pack, Refills: 0  Start date: 6/30/2022      cephALEXin (Keflex) 500 mg capsule Take 1 Capsule by mouth four (4) times daily for 5 days. Qty: 20 Capsule, Refills: 0  Start date: 6/30/2022, End date: 7/5/2022               Diagnosis/Clinical Impression     Clinical Impression:   1.  Contact dermatitis due to poison ivy        Attestations: Michael Evans PA-C    Please note that this dictation was completed with [employer], the [employer] voice recognition software. Quite often unanticipated grammatical, syntax, homophones, and other interpretive errors are inadvertently transcribed by the computer software. Please disregard these errors. Please excuse any errors that have escaped final proofreading. Thank you.

## 2022-06-30 NOTE — Clinical Note
[address] EMERGENCY DEPARTMENT  [address] [facility] [address]  [phone]    Work/School Note    Date: 6/30/2022    To Whom It May concern:      Vince Ramey was seen and treated today in the emergency room by the following provider(s):  Physician Assistant: Jose De Jesus Cervantes PA-C. Vince Ramey is excused from work/school on 06/30/22. He is clear to return to work/school on 07/01/22.         Sincerely,          Fransico Evans PA-C

## 2022-06-30 NOTE — ED TRIAGE NOTES
Pt c/o poison ivy rash about a week ago. Has noticed worsened rash on arms and legs for the last 2 days. Rash itches.

## 2023-05-24 RX ORDER — LISINOPRIL 20 MG/1
1 TABLET ORAL DAILY
COMMUNITY
Start: 2018-09-28

## 2023-05-24 RX ORDER — ESCITALOPRAM OXALATE 20 MG/1
20 TABLET ORAL DAILY
COMMUNITY

## 2023-05-24 RX ORDER — METHYLPREDNISOLONE 4 MG/1
TABLET ORAL
COMMUNITY
Start: 2022-06-30

## 2023-05-24 RX ORDER — CLONAZEPAM 0.5 MG/1
TABLET ORAL
COMMUNITY

## 2023-05-24 RX ORDER — LEVOTHYROXINE SODIUM 0.1 MG/1
TABLET ORAL
COMMUNITY
Start: 2018-12-09

## 2023-05-24 RX ORDER — BUPROPION HYDROCHLORIDE 100 MG/1
TABLET, EXTENDED RELEASE ORAL
COMMUNITY